# Patient Record
Sex: MALE | Race: WHITE
[De-identification: names, ages, dates, MRNs, and addresses within clinical notes are randomized per-mention and may not be internally consistent; named-entity substitution may affect disease eponyms.]

---

## 2018-02-16 ENCOUNTER — HOSPITAL ENCOUNTER (OUTPATIENT)
Age: 58
End: 2018-02-16
Payer: COMMERCIAL

## 2018-02-16 DIAGNOSIS — K63.5: ICD-10-CM

## 2018-02-16 DIAGNOSIS — Z12.11: Primary | ICD-10-CM

## 2018-02-16 PROCEDURE — 88305 TISSUE EXAM BY PATHOLOGIST: CPT

## 2018-12-22 ENCOUNTER — HOSPITAL ENCOUNTER (OUTPATIENT)
Age: 58
End: 2018-12-22
Payer: COMMERCIAL

## 2018-12-22 DIAGNOSIS — N40.0: ICD-10-CM

## 2018-12-22 DIAGNOSIS — R03.0: Primary | ICD-10-CM

## 2018-12-22 LAB
ANION GAP: 5 (ref 5–15)
BUN SERPL-MCNC: 10 MG/DL (ref 7–18)
BUN/CREAT RATIO: 10.5 RATIO (ref 10–20)
CALCIUM SERPL-MCNC: 8.8 MG/DL (ref 8.5–10.1)
CARBON DIOXIDE: 31 MMOL/L (ref 21–32)
CHLORIDE: 106 MMOL/L (ref 98–107)
CHOLEST SERPL-MCNC: 241 MG/DL
EST GLOM FILT RATE - AFR AMER: 105 ML/MIN (ref 60–?)
GLUCOSE: 92 MG/DL (ref 74–106)
POTASSIUM: 4.9 MMOL/L (ref 3.5–5.1)
PSA,TOTAL - ANNUAL SCREEN: 0.55 NG/ML (ref 0–4)
TRIGLYCERIDES: 201 MG/DL
VLDLC SERPL-MCNC: 40 MG/DL (ref 5–40)

## 2018-12-22 PROCEDURE — 36415 COLL VENOUS BLD VENIPUNCTURE: CPT

## 2018-12-22 PROCEDURE — 80048 BASIC METABOLIC PNL TOTAL CA: CPT

## 2018-12-22 PROCEDURE — 84153 ASSAY OF PSA TOTAL: CPT

## 2018-12-22 PROCEDURE — 80061 LIPID PANEL: CPT

## 2018-12-22 PROCEDURE — G0103 PSA SCREENING: HCPCS

## 2019-12-27 ENCOUNTER — HOSPITAL ENCOUNTER (OUTPATIENT)
Age: 59
End: 2019-12-27
Payer: COMMERCIAL

## 2019-12-27 DIAGNOSIS — Z13.1: ICD-10-CM

## 2019-12-27 DIAGNOSIS — Z13.220: Primary | ICD-10-CM

## 2019-12-27 DIAGNOSIS — N40.0: ICD-10-CM

## 2019-12-27 LAB
ANION GAP: 3 (ref 5–15)
BUN SERPL-MCNC: 13 MG/DL (ref 7–18)
BUN/CREAT RATIO: 13.3 RATIO (ref 10–20)
CALCIUM SERPL-MCNC: 8.4 MG/DL (ref 8.5–10.1)
CARBON DIOXIDE: 31 MMOL/L (ref 21–32)
CHLORIDE: 105 MMOL/L (ref 98–107)
CHOLEST SERPL-MCNC: 201 MG/DL
EST GLOM FILT RATE - AFR AMER: 102 ML/MIN (ref 60–?)
GLUCOSE: 96 MG/DL (ref 74–106)
POTASSIUM: 4 MMOL/L (ref 3.5–5.1)
PSA,TOTAL - ANNUAL SCREEN: 0.44 NG/ML (ref 0–4)
TRIGLYCERIDES: 138 MG/DL
VLDLC SERPL-MCNC: 28 MG/DL (ref 5–40)

## 2019-12-27 PROCEDURE — 80048 BASIC METABOLIC PNL TOTAL CA: CPT

## 2019-12-27 PROCEDURE — 36415 COLL VENOUS BLD VENIPUNCTURE: CPT

## 2019-12-27 PROCEDURE — 84153 ASSAY OF PSA TOTAL: CPT

## 2019-12-27 PROCEDURE — 80061 LIPID PANEL: CPT

## 2019-12-27 PROCEDURE — G0103 PSA SCREENING: HCPCS

## 2020-11-14 ENCOUNTER — HOSPITAL ENCOUNTER (OUTPATIENT)
Age: 60
End: 2020-11-14
Payer: COMMERCIAL

## 2020-11-14 DIAGNOSIS — Z00.00: Primary | ICD-10-CM

## 2020-11-14 LAB
ANION GAP: 5 (ref 5–15)
BUN SERPL-MCNC: 10 MG/DL (ref 7–18)
BUN/CREAT RATIO: 10.7 RATIO (ref 10–20)
CALCIUM SERPL-MCNC: 8.7 MG/DL (ref 8.5–10.1)
CARBON DIOXIDE: 29 MMOL/L (ref 21–32)
CHLORIDE: 107 MMOL/L (ref 98–107)
CHOLEST SERPL-MCNC: 198 MG/DL
EST GLOM FILT RATE - AFR AMER: 107 ML/MIN (ref 60–?)
GLUCOSE: 90 MG/DL (ref 74–106)
MICROALBUMIN UR-MCNC: 10.6 MG/L
POTASSIUM: 4.5 MMOL/L (ref 3.5–5.1)
TRIGLYCERIDES: 144 MG/DL
VLDLC SERPL-MCNC: 29 MG/DL (ref 5–40)

## 2020-11-14 PROCEDURE — 80061 LIPID PANEL: CPT

## 2020-11-14 PROCEDURE — 36415 COLL VENOUS BLD VENIPUNCTURE: CPT

## 2020-11-14 PROCEDURE — 80048 BASIC METABOLIC PNL TOTAL CA: CPT

## 2020-11-14 PROCEDURE — 82043 UR ALBUMIN QUANTITATIVE: CPT

## 2021-06-09 ENCOUNTER — HOSPITAL ENCOUNTER (OUTPATIENT)
Age: 61
End: 2021-06-09
Payer: COMMERCIAL

## 2021-06-09 DIAGNOSIS — M25.529: Primary | ICD-10-CM

## 2021-06-09 PROCEDURE — 73080 X-RAY EXAM OF ELBOW: CPT

## 2021-09-01 ENCOUNTER — HOSPITAL ENCOUNTER (OUTPATIENT)
Age: 61
End: 2021-09-01
Payer: COMMERCIAL

## 2021-09-01 DIAGNOSIS — Z20.822: Primary | ICD-10-CM

## 2021-09-01 PROCEDURE — U0005 INFEC AGEN DETEC AMPLI PROBE: HCPCS

## 2021-09-01 PROCEDURE — U0003 INFECTIOUS AGENT DETECTION BY NUCLEIC ACID (DNA OR RNA); SEVERE ACUTE RESPIRATORY SYNDROME CORONAVIRUS 2 (SARS-COV-2) (CORONAVIRUS DISEASE [COVID-19]), AMPLIFIED PROBE TECHNIQUE, MAKING USE OF HIGH THROUGHPUT TECHNOLOGIES AS DESCRIBED BY CMS-2020-01-R: HCPCS

## 2021-09-01 PROCEDURE — 87635 SARS-COV-2 COVID-19 AMP PRB: CPT

## 2022-02-01 ENCOUNTER — HOSPITAL ENCOUNTER (OUTPATIENT)
Dept: HOSPITAL 100 - CVS | Age: 62
Discharge: ANOTHER HOSPITAL | End: 2022-02-01
Payer: COMMERCIAL

## 2022-02-01 DIAGNOSIS — R01.1: Primary | ICD-10-CM

## 2022-02-01 PROCEDURE — A4216 STERILE WATER/SALINE, 10 ML: HCPCS

## 2022-02-01 PROCEDURE — 93306 TTE W/DOPPLER COMPLETE: CPT

## 2022-02-01 PROCEDURE — C8929 TTE W OR WO FOL WCON,DOPPLER: HCPCS

## 2022-04-04 ENCOUNTER — HOSPITAL ENCOUNTER (OUTPATIENT)
Age: 62
Discharge: HOME | End: 2022-04-04
Payer: COMMERCIAL

## 2022-04-04 DIAGNOSIS — G47.30: Primary | ICD-10-CM

## 2022-04-04 PROCEDURE — 95806 SLEEP STUDY UNATT&RESP EFFT: CPT

## 2022-05-06 ENCOUNTER — HOSPITAL ENCOUNTER (OUTPATIENT)
Dept: HOSPITAL 100 - MTRAD | Age: 62
Discharge: HOME | End: 2022-05-06
Payer: COMMERCIAL

## 2022-05-06 DIAGNOSIS — M25.522: Primary | ICD-10-CM

## 2022-05-06 PROCEDURE — 73080 X-RAY EXAM OF ELBOW: CPT

## 2023-01-06 ENCOUNTER — HOSPITAL ENCOUNTER (OUTPATIENT)
Dept: HOSPITAL 100 - LAB | Age: 63
Discharge: HOME | End: 2023-01-06
Payer: COMMERCIAL

## 2023-01-06 DIAGNOSIS — R35.1: Primary | ICD-10-CM

## 2023-01-06 DIAGNOSIS — R53.83: ICD-10-CM

## 2023-01-06 LAB — PSA,TOTAL - ANNUAL SCREEN: 1.25 NG/ML (ref 0–4)

## 2023-01-06 PROCEDURE — G0103 PSA SCREENING: HCPCS

## 2023-01-06 PROCEDURE — 84403 ASSAY OF TOTAL TESTOSTERONE: CPT

## 2023-01-06 PROCEDURE — 84153 ASSAY OF PSA TOTAL: CPT

## 2023-12-04 DIAGNOSIS — R35.1 NOCTURIA: Primary | ICD-10-CM

## 2023-12-06 RX ORDER — TROSPIUM CHLORIDE 20 MG/1
20 TABLET, FILM COATED ORAL 2 TIMES DAILY
Qty: 60 TABLET | Refills: 0 | Status: SHIPPED | OUTPATIENT
Start: 2023-12-06 | End: 2024-01-04

## 2023-12-30 DIAGNOSIS — R35.1 NOCTURIA: ICD-10-CM

## 2024-01-04 RX ORDER — TROSPIUM CHLORIDE 20 MG/1
20 TABLET, FILM COATED ORAL 2 TIMES DAILY
Qty: 60 TABLET | Refills: 0 | Status: SHIPPED | OUTPATIENT
Start: 2024-01-04 | End: 2024-01-30

## 2024-01-30 DIAGNOSIS — R35.1 NOCTURIA: ICD-10-CM

## 2024-01-30 RX ORDER — TROSPIUM CHLORIDE 20 MG/1
20 TABLET, FILM COATED ORAL 2 TIMES DAILY
Qty: 60 TABLET | Refills: 0 | Status: SHIPPED | OUTPATIENT
Start: 2024-01-30 | End: 2024-02-26

## 2024-02-09 ENCOUNTER — HOSPITAL ENCOUNTER (EMERGENCY)
Age: 64
Discharge: HOME | End: 2024-02-09
Payer: COMMERCIAL

## 2024-02-09 VITALS
HEART RATE: 89 BPM | RESPIRATION RATE: 18 BRPM | OXYGEN SATURATION: 95 % | SYSTOLIC BLOOD PRESSURE: 148 MMHG | DIASTOLIC BLOOD PRESSURE: 69 MMHG | TEMPERATURE: 97.9 F

## 2024-02-09 VITALS — BODY MASS INDEX: 34.9 KG/M2

## 2024-02-09 VITALS — OXYGEN SATURATION: 98 %

## 2024-02-09 VITALS — SYSTOLIC BLOOD PRESSURE: 107 MMHG | DIASTOLIC BLOOD PRESSURE: 69 MMHG

## 2024-02-09 DIAGNOSIS — Z87.891: ICD-10-CM

## 2024-02-09 DIAGNOSIS — R07.9: Primary | ICD-10-CM

## 2024-02-09 LAB
ANION GAP: 7 (ref 5–15)
BUN SERPL-MCNC: 9 MG/DL (ref 7–18)
BUN/CREAT RATIO: 9.8 RATIO (ref 10–20)
CALCIUM SERPL-MCNC: 9.2 MG/DL (ref 8.5–10.1)
CARBON DIOXIDE: 25 MMOL/L (ref 21–32)
CARDIAC TROPONIN I PNL SERPL HS: 10 PG/ML (ref 3–78)
CHLORIDE: 107 MMOL/L (ref 98–107)
DEPRECATED RDW RBC: 44.3 FL (ref 35.1–43.9)
ERYTHROCYTE [DISTWIDTH] IN BLOOD: 12.4 % (ref 11.6–14.6)
EST GLOM FILT RATE - AFR AMER: 107 ML/MIN (ref 60–?)
ESTIMATED CREATININE CLEARANCE: 98.74 ML/MIN
GLUCOSE: 140 MG/DL (ref 74–106)
HCT VFR BLD AUTO: 43.7 % (ref 40–54)
HGB BLD-MCNC: 15.3 G/DL (ref 13–16.5)
IMMATURE GRANULOCYTES COUNT: 0.03 X10^3/UL (ref 0–0)
MCV RBC: 96.9 FL (ref 80–94)
MEAN CORP HGB CONC: 35 G/DL (ref 32–36)
MEAN PLATELET VOL.: 9.6 FL (ref 6.2–12)
NRBC FLAGGED BY ANALYZER: 0 % (ref 0–5)
PLATELET # BLD: 277 K/MM3 (ref 150–450)
POTASSIUM: 3.5 MMOL/L (ref 3.5–5.1)
RBC # BLD AUTO: 4.51 M/MM3 (ref 4.6–6.2)
TROPONIN-I HS: 10 PG/ML (ref 3–78)
WBC # BLD AUTO: 11.6 K/MM3 (ref 4.4–11)

## 2024-02-09 PROCEDURE — 71045 X-RAY EXAM CHEST 1 VIEW: CPT

## 2024-02-09 PROCEDURE — 80048 BASIC METABOLIC PNL TOTAL CA: CPT

## 2024-02-09 PROCEDURE — A4216 STERILE WATER/SALINE, 10 ML: HCPCS

## 2024-02-09 PROCEDURE — 93005 ELECTROCARDIOGRAM TRACING: CPT

## 2024-02-09 PROCEDURE — 85025 COMPLETE CBC W/AUTO DIFF WBC: CPT

## 2024-02-09 PROCEDURE — 84484 ASSAY OF TROPONIN QUANT: CPT

## 2024-02-09 PROCEDURE — 99284 EMERGENCY DEPT VISIT MOD MDM: CPT

## 2024-02-09 NOTE — [id number]
HPI
<LAKSHMI Lucia - Last Filed: 02/09/24 22:03>
History of Present Illness
Chief Complaint: Chest Pain
Narrative
Narrative: 
63-year-old male with no significant past medical history developed midsternal chest pain around 5:30 PM while doing laundry.  He states it feels like a throbbing sensation and occasionally radiates towards the left shoulder.  No pain in the jaw or 
down the arm.  No shortness of breath, nausea or vomiting, or diaphoresis.  He has had a lingering cough since Christmas when he had a viral illness but it is not worsened and he has no recent fever.  He smokes about 6 cigarettes a day.  He has no 
personal cardiac history.  He states he had normal stress testing about 10 years ago.  No family cardiac history.  No history of DVT/PE or risk factors.

PFSH
<LAKSHMI Lucia - Last Filed: 02/09/24 22:03>
formerly [facility]
Home Medications

doxazosin 4 mg tablet 4 mg PO DAILY 03/02/22 [History Last Taken Unknown]
tadalafil 5 mg tablet 5 mg PO DAILY 03/02/22 [History Last Taken Unknown]




Allergy/AdvReac Type Severity Reaction Status Date / Time
acetaminophen AdvReac  Hives Verified 02/09/24 19:28


Surgical History (Reviewed 04/15/22 @ 14:33 by Rena Musa NP, NP-C)

H/O removal of cyst


Social History (Reviewed 04/15/22 @ 14:33 by Rena Musa NP, NP-C)
Smoking Status:  Former smoker quit date: 01/01/12 pack-years: 34 



ROS
<LAKSHMI Lucia - Last Filed: 02/09/24 22:03>
ROS ED
ROS Narrative
Constitutional: Negative for fever, chills, malaise.
CVS: Positive for chest pain.  Negative for syncope.
Respiratory: Positive for cough.  Negative for shortness of breath.
GI: Negative for abdominal pain, nausea, vomiting.
Neuro: Negative for headache.

EXAM
<LAKSHMI Lucia - Last Filed: 02/09/24 22:03>
Physical Exam
Narrative
Exam Narrative: 
CONST: Patient sitting in no acute distress.
EYES: Normal inspection.
NECK: Normal inspection.
RESP: No respiratory distress, CTAB.
CVS: Regular rate and rhythm, no murmur, no gallop. 
ABD: Soft and nontender, no guarding or rebound, nondistended.
SKIN: Color normal, no rash, warm, dry, intact.
EXTREMITIES: Normal appearance, no pedal edema.
NEURO: Oriented x4.
PSYCH: Normal affect.
Const
Vital Signs: 



 02/09/24
19:26 02/09/24
19:59
Temperature 97.9 F 
Temperature Source Temporal 
Pulse Rate 89 
Respiratory Rate 18 
Blood Pressure 148/69 H 
Blood Pressure Mean 95 
Pulse Ox 95 98
Oxygen Delivery Method Room Air Room Air




<Sheldon Gupta MD - Last Filed: 02/09/24 22:52>
Physical Exam
Const
Vital Signs: 



 02/09/24
19:26 02/09/24
19:59
Temperature 97.9 F 
Temperature Source Temporal 
Pulse Rate 89 
Respiratory Rate 18 
Blood Pressure 148/69 H 
Blood Pressure Mean 95 
Pulse Ox 95 98
Oxygen Delivery Method Room Air Room Air




MDM
<LAKSHMI Lucia - Last Filed: 02/09/24 22:03>
[facility]
MDM Narrative
Medical decision making narrative: 

History gathered from: Patient and wife

Patient presents with chest pain that radiates toward his left shoulder that started at 5:30 PM.  He appears well and nontoxic with stable vital signs.  Normal cardiopulmonary exam.  No chest or abdominal tenderness.  No clinical signs of DVT.  CBC 
and BMP are unremarkable.  EKG is sinus rhythm with no ischemic changes.  Troponin is 10, delta pending.  CXR shows no acute process.  Patient was treated with aspirin 325 mg and plan is if serial cardiac enzymes are negative he can follow-up for 
outpatient stress test.
Lab Data
Attestation: I reviewed the patient's lab results.
Labs: 

Laboratory Results - last 24 hr

  02/09/24 02/09/24
  19:55 22:00
WBC  11.6 H 
RBC  4.51 L 
Hgb  15.3 
Hct  43.7 
MCV  96.9 H 
MCH  33.9 H 
MCHC  35.0 
RDW Std Deviation  44.3 H 
RDW Coeff of Mayte  12.4 
Plt Count  277 
MPV  9.6 
Immature Gran % (Auto)  0.300 
Neut % (Auto)  74.5 H 
Lymph % (Auto)  12.8 L 
Mono % (Auto)  10.8 H 
Eos % (Auto)  1.0 
Baso % (Auto)  0.6 
Absolute Neuts (auto)  8.6 H 
Absolute Lymphs (auto)  1.48 
Nucleated RBC %  0 
Sodium  139 
Potassium  3.5 
Chloride  107 
Carbon Dioxide  25.0 
Anion Gap  7 
BUN  9 
Creatinine  0.92 
Estim Creat Clear Calc  98.74 
Est GFR (MDRD) Af Amer  107 
Est GFR (MDRD) Non-Af  89 
BUN/Creatinine Ratio  9.8 L 
Glucose  140 H 
Calcium  9.2 
Troponin I High Sens  10  10



Radiography
Diagnostic Testing: 

Clinical Impression(s) from Imaging Studies

Chest X-Ray  02/09/24 20:00
IMPRESSION:
No radiographic evidence of acute cardiopulmonary disease.
 
Electronically Signed:
Santos Steel DO
2024/02/09 at 20:30 EST
Reading Location ID and State: [id number] / [place]
Tel [phone], Service support  [phone], Fax [phone]
 



ED attending interpretation of 1-view chest x-ray shows normal heart size, no acute infiltrate, edema, or effusion.

EKG
Initial EKG: 
      Attestation: I personally reviewed and interpreted this EKG as follows:
      Interpretation: Sinus Rhythm and No Acute Injury Pattern
      Comments: Normal sinus rhythm 84 bpm 
Normal intervals, no acute ischemic changes

<Sheldon Gupta MD - Last Filed: 02/09/24 22:52>
MDM
MDM Narrative
Medical decision making narrative: 

History gathered from: Patient and wife

Patient presents with chest pain that radiates toward his left shoulder that started at 5:30 PM.  He appears well and nontoxic with stable vital signs.  Normal cardiopulmonary exam.  No chest or abdominal tenderness.  No clinical signs of DVT.  CBC 
and BMP are unremarkable.  EKG is sinus rhythm with no ischemic changes.  Troponin is 10, delta pending.  CXR shows no acute process.  Patient was treated with aspirin 325 mg and plan is if serial cardiac enzymes are negative he can follow-up for 
outpatient stress test.


Dr. Gupta:
I have personally performed a face to face assessment of the patient and have reviewed the GABBIE Note. I performed a substantive portion of the visit including all aspects of the following. My key findings include:
History is chest pain.
Exam is afebrile.  Vital signs noted.  Regular rate and rhythm.  Lungs clear to auscultation bilaterally.  Abdomen soft nontender with normoactive bowel sounds.
Medical Decision Making: Check EKG.  Check chest x-ray.  Chest x-ray 1 view interpreted by myself independently shows no evidence of pneumothorax or pneumonia.  I reviewed the radiology report which confirms my independent interpretation.  Serial 
troponins show no delta as initial is 10 and repeat is 10 at 2 hours.  At this point in time, I feel he can be discharged safely home with follow-up.  Blood pressure is now 121 systolic with a heart rate in the 70s.  Disposition is discharged home 
in stable condition.
Other additions or changes: [None]
History & Record Review
Discussion w/independent historian: Patient and Family
Lab Data
Labs: 

Laboratory Results - last 24 hr

  02/09/24 02/09/24
  19:55 22:00
WBC  11.6 H 
RBC  4.51 L 
Hgb  15.3 
Hct  43.7 
MCV  96.9 H 
MCH  33.9 H 
MCHC  35.0 
RDW Std Deviation  44.3 H 
RDW Coeff of Mayte  12.4 
Plt Count  277 
MPV  9.6 
Immature Gran % (Auto)  0.300 
Neut % (Auto)  74.5 H 
Lymph % (Auto)  12.8 L 
Mono % (Auto)  10.8 H 
Eos % (Auto)  1.0 
Baso % (Auto)  0.6 
Absolute Neuts (auto)  8.6 H 
Absolute Lymphs (auto)  1.48 
Nucleated RBC %  0 
Sodium  139 
Potassium  3.5 
Chloride  107 
Carbon Dioxide  25.0 
Anion Gap  7 
BUN  9 
Creatinine  0.92 
Estim Creat Clear Calc  98.74 
Est GFR (MDRD) Af Amer  107 
Est GFR (MDRD) Non-Af  89 
BUN/Creatinine Ratio  9.8 L 
Glucose  140 H 
Calcium  9.2 
Troponin I High Sens  10  10



Radiography
Diagnostic Testing: 

Clinical Impression(s) from Imaging Studies

Chest X-Ray  02/09/24 20:00
IMPRESSION:
No radiographic evidence of acute cardiopulmonary disease.
 
Electronically Signed:
Santos Steel DO
2024/02/09 at 20:30 EST
Reading Location ID and State: [id number] / [place]
Tel [phone], Service support  [phone], Fax [phone]
 





Discharge Plan
Triage
Chief Complaint: Chest Pain

ED Midlevel Provider: Meredith Jorge

ED Provider: Sheldon Gupta

Dx/Rx/DC Orders
Clinical Impression:
 Chest pain


Instructions:  ED Chest Pain, Uncertain Cause

Prescriptions:
No Action
  doxazosin 4 mg tablet 
   4 mg PO DAILY 
  tadalafil 5 mg tablet 
   5 mg PO DAILY 

Primary Care Provider: Valerio Marte

Referrals:
Valerio Marte MD [Primary Care Provider] - 3-5 Days

Activity Restrictions/Additional Instructions:
Follow-up with your primary care provider for outpatient stress testing.  Return with increased pain, new or worsening symptoms.

Disposition
***Disposition***: Home, Self Care

## 2024-02-09 NOTE — EDS_ITS
HPI    
<LAKSHMI Lucia - Last Filed: 02/09/24 22:03>    
History of Present Illness    
Chief Complaint: Chest Pain    
Narrative    
Narrative:     
63-year-old male with no significant past medical history developed midsternal   
chest pain around 5:30 PM while doing laundry.  He states it feels like a   
throbbing sensation and occasionally radiates towards the left shoulder.  No   
pain in the jaw or down the arm.  No shortness of breath, nausea or vomiting, or  
diaphoresis.  He has had a lingering cough since Christmas when he had a viral   
illness but it is not worsened and he has no recent fever.  He smokes about 6   
cigarettes a day.  He has no personal cardiac history.  He states he had normal   
stress testing about 10 years ago.  No family cardiac history.  No history of   
DVT/PE or risk factors.    
    
PFSH    
<LAKSHMI Lucia - Last Filed: 02/09/24 22:03>    
[facility]    
                                Home Medications    
    
doxazosin 4 mg tablet 4 mg PO DAILY 03/02/22 [History Last Taken Unknown]    
tadalafil 5 mg tablet 5 mg PO DAILY 03/02/22 [History Last Taken Unknown]    
    
    
                                            
    
    
    
Allergy/AdvReac Type Severity Reaction Status Date / Time    
     
acetaminophen AdvReac  Hives Verified 02/09/24 19:28    
    
    
    
Surgical History (Reviewed 04/15/22 @ 14:33 by Rena Musa NP, NP-C)    
    
H/O removal of cyst    
    
    
Social History (Reviewed 04/15/22 @ 14:33 by Rena Musa NP, NP-C)    
Smoking Status:  Former smoker quit date: 01/01/12 pack-years: 34     
    
    
    
ROS    
<LAKSHMI Lucia - Last Filed: 02/09/24 22:03>    
ROS ED    
ROS Narrative    
Constitutional: Negative for fever, chills, malaise.    
CVS: Positive for chest pain.  Negative for syncope.    
Respiratory: Positive for cough.  Negative for shortness of breath.    
GI: Negative for abdominal pain, nausea, vomiting.    
Neuro: Negative for headache.    
    
EXAM    
<LAKSHMI Lucia - Last Filed: 02/09/24 22:03>    
Physical Exam    
Narrative    
Exam Narrative:     
CONST: Patient sitting in no acute distress.    
EYES: Normal inspection.    
NECK: Normal inspection.    
RESP: No respiratory distress, CTAB.    
CVS: Regular rate and rhythm, no murmur, no gallop.     
ABD: Soft and nontender, no guarding or rebound, nondistended.    
SKIN: Color normal, no rash, warm, dry, intact.    
EXTREMITIES: Normal appearance, no pedal edema.    
NEURO: Oriented x4.    
PSYCH: Normal affect.    
Const    
Vital Signs:     
    
                                            
    
    
    
 02/09/24    
19:26 02/09/24    
19:59    
     
Temperature 97.9 F     
     
Temperature Source Temporal     
     
Pulse Rate 89     
     
Respiratory Rate 18     
     
Blood Pressure 148/69 H     
     
Blood Pressure Mean 95     
     
Pulse Ox 95 98    
     
Oxygen Delivery Method Room Air Room Air    
    
    
    
    
    
<Sheldon Gupta MD - Last Filed: 02/09/24 22:52>    
Physical Exam    
Const    
Vital Signs:     
    
                                            
    
    
    
 02/09/24    
19:26 02/09/24    
19:59    
     
Temperature 97.9 F     
     
Temperature Source Temporal     
     
Pulse Rate 89     
     
Respiratory Rate 18     
     
Blood Pressure 148/69 H     
     
Blood Pressure Mean 95     
     
Pulse Ox 95 98    
     
Oxygen Delivery Method Room Air Room Air    
    
    
    
    
    
MDM    
<LAKSHMI Lucia - Last Filed: 02/09/24 22:03>    
[facility]    
MDM Narrative    
Medical decision making narrative:     
    
History gathered from: Patient and wife    
    
Patient presents with chest pain that radiates toward his left shoulder that   
started at 5:30 PM.  He appears well and nontoxic with stable vital signs.    
Normal cardiopulmonary exam.  No chest or abdominal tenderness.  No clinical   
signs of DVT.  CBC and BMP are unremarkable.  EKG is sinus rhythm with no   
ischemic changes.  Troponin is 10, delta pending.  CXR shows no acute process.    
Patient was treated with aspirin 325 mg and plan is if serial cardiac enzymes   
are negative he can follow-up for outpatient stress test.    
Lab Data    
Attestation: I reviewed the patient's lab results.    
Labs:     
    
                         Laboratory Results - last 24 hr    
    
    
    
  02/09/24 02/09/24    
    
  19:55 22:00    
     
WBC  11.6 H     
     
RBC  4.51 L     
     
Hgb  15.3     
     
Hct  43.7     
     
MCV  96.9 H     
     
MCH  33.9 H     
     
MCHC  35.0     
     
RDW Std Deviation  44.3 H     
     
RDW Coeff of Mayte  12.4     
     
Plt Count  277     
     
MPV  9.6     
     
Immature Gran % (Auto)  0.300     
     
Neut % (Auto)  74.5 H     
     
Lymph % (Auto)  12.8 L     
     
Mono % (Auto)  10.8 H     
     
Eos % (Auto)  1.0     
     
Baso % (Auto)  0.6     
     
Absolute Neuts (auto)  8.6 H     
     
Absolute Lymphs (auto)  1.48     
     
Nucleated RBC %  0     
     
Sodium  139     
     
Potassium  3.5     
     
Chloride  107     
     
Carbon Dioxide  25.0     
     
Anion Gap  7     
     
BUN  9     
     
Creatinine  0.92     
     
Estim Creat Clear Calc  98.74     
     
Est GFR (MDRD) Af Amer  107     
     
Est GFR (MDRD) Non-Af  89     
     
BUN/Creatinine Ratio  9.8 L     
     
Glucose  140 H     
     
Calcium  9.2     
     
Troponin I High Sens  10  10    
    
    
    
    
Radiography    
Diagnostic Testing:     
    
Clinical Impression(s) from Imaging Studies    
    
Chest X-Ray  02/09/24 20:00    
IMPRESSION:    
No radiographic evidence of acute cardiopulmonary disease.    
     
Electronically Signed:    
Santos Steel DO    
2024/02/09 at 20:30 EST    
Reading Location ID and State: [id number] / [place]    
Tel [phone], Service support  [phone], Fax [phone]    
     
    
    
    
ED attending interpretation of 1-view chest x-ray shows normal heart size, no   
acute infiltrate, edema, or effusion.    
    
EKG    
Initial EKG:     
      Attestation: I personally reviewed and interpreted this EKG as follows:    
      Interpretation: Sinus Rhythm and No Acute Injury Pattern    
      Comments: Normal sinus rhythm 84 bpm     
Normal intervals, no acute ischemic changes    
    
<Sheldon Gupta MD - Last Filed: 02/09/24 22:52>    
MDM    
MDM Narrative    
Medical decision making narrative:     
    
History gathered from: Patient and wife    
    
Patient presents with chest pain that radiates toward his left shoulder that   
started at 5:30 PM.  He appears well and nontoxic with stable vital signs.    
Normal cardiopulmonary exam.  No chest or abdominal tenderness.  No clinical   
signs of DVT.  CBC and BMP are unremarkable.  EKG is sinus rhythm with no   
ischemic changes.  Troponin is 10, delta pending.  CXR shows no acute process.    
Patient was treated with aspirin 325 mg and plan is if serial cardiac enzymes   
are negative he can follow-up for outpatient stress test.    
    
    
Dr. Gupta:    
I have personally performed a face to face assessment of the patient and have   
reviewed the GABBIE Note. I performed a substantive portion of the visit including   
all aspects of the following. My key findings include:    
History is chest pain.    
Exam is afebrile.  Vital signs noted.  Regular rate and rhythm.  Lungs clear to   
auscultation bilaterally.  Abdomen soft nontender with normoactive bowel sounds.    
Medical Decision Making: Check EKG.  Check chest x-ray.  Chest x-ray 1 view   
interpreted by myself independently shows no evidence of pneumothorax or   
pneumonia.  I reviewed the radiology report which confirms my independent   
interpretation.  Serial troponins show no delta as initial is 10 and repeat is   
10 at 2 hours.  At this point in time, I feel he can be discharged safely home   
with follow-up.  Blood pressure is now 121 systolic with a heart rate in the   
70s.  Disposition is discharged home in stable condition.    
Other additions or changes: [None]    
History & Record Review    
Discussion w/independent historian: Patient and Family    
Lab Data    
Labs:     
    
                         Laboratory Results - last 24 hr    
    
    
    
  02/09/24 02/09/24    
    
  19:55 22:00    
     
WBC  11.6 H     
     
RBC  4.51 L     
     
Hgb  15.3     
     
Hct  43.7     
     
MCV  96.9 H     
     
MCH  33.9 H     
     
MCHC  35.0     
     
RDW Std Deviation  44.3 H     
     
RDW Coeff of Mayte  12.4     
     
Plt Count  277     
     
MPV  9.6     
     
Immature Gran % (Auto)  0.300     
     
Neut % (Auto)  74.5 H     
     
Lymph % (Auto)  12.8 L     
     
Mono % (Auto)  10.8 H     
     
Eos % (Auto)  1.0     
     
Baso % (Auto)  0.6     
     
Absolute Neuts (auto)  8.6 H     
     
Absolute Lymphs (auto)  1.48     
     
Nucleated RBC %  0     
     
Sodium  139     
     
Potassium  3.5     
     
Chloride  107     
     
Carbon Dioxide  25.0     
     
Anion Gap  7     
     
BUN  9     
     
Creatinine  0.92     
     
Estim Creat Clear Calc  98.74     
     
Est GFR (MDRD) Af Amer  107     
     
Est GFR (MDRD) Non-Af  89     
     
BUN/Creatinine Ratio  9.8 L     
     
Glucose  140 H     
     
Calcium  9.2     
     
Troponin I High Sens  10  10    
    
    
    
    
Radiography    
Diagnostic Testing:     
    
Clinical Impression(s) from Imaging Studies    
    
Chest X-Ray  02/09/24 20:00    
IMPRESSION:    
No radiographic evidence of acute cardiopulmonary disease.    
     
Electronically Signed:    
Santos Steel DO    
2024/02/09 at 20:30 EST    
Reading Location ID and State: [address] / [place]    
Tel [phone], Service support  [phone], Fax [phone]    
     
    
    
    
    
    
Discharge Plan    
Triage    
Chief Complaint: Chest Pain    
    
ED Midlevel Provider: Meredith Jorge    
    
ED Provider: Sheldon Gupta    
    
Dx/Rx/DC Orders    
Clinical Impression:    
 Chest pain    
    
    
Instructions:  ED Chest Pain, Uncertain Cause    
    
Prescriptions:    
No Action    
  doxazosin 4 mg tablet     
   4 mg PO DAILY     
  tadalafil 5 mg tablet     
   5 mg PO DAILY     
    
Primary Care Provider: Valerio Marte    
    
Referrals:    
Valerio Marte MD [Primary Care Provider] - 3-5 Days    
    
Activity Restrictions/Additional Instructions:    
Follow-up with your primary care provider for outpatient stress testing.  Return  
with increased pain, new or worsening symptoms.    
    
Disposition    
***Disposition***: Home, Self Care

## 2024-02-09 NOTE — XMS RPT_ITS
Comprehensive CCD (C-CDA v2.1)  
  
                          Created on: 2024  
  
  
TIANAMENDYHAIARGENTINA RUIZ  
External Reference #: [id number]  
: 1960  
Sex: Male  
  
Demographics  
  
  
                                        Address             [address] [place] [address]  [address]  
   
                                        Home Phone          [phone]  
   
                                        Work Phone          [phone]  
   
                                        Work Phone          [phone]  
   
                                        Preferred Language  en  
   
                                        Marital Status        
   
                                        Holiness Affiliation Unknown  
   
                                        Race                White  
   
                                        Ethnic Group        Unknown  
  
  
Author  
  
  
                                        Name                Unknown  
   
                                        Address             [address]  
[address]  
[place]  [address]  
   
                                        Phone               [phone]  
   
                                        Organization        [facility]  
  
  
Care Team Providers  
  
  
                                Care Team Member Name Role            Phone  
   
                                None, No PCP    Unavailable     Unavailable  
   
                                Unavailable     Unavailable     [phone]  
   
                                MD PATO CLEARY Attending       Unavailabl  
e  
   
                                CLEARYMD PATO HICKEY Referring       Unavailabl  
e  
   
                                MD PATO CLEARY Attending       Unavailabl  
e  
   
                                MD PATO CLEARY Referring       Unavailabl  
e  
   
                                MD PATO CLEARY Attending       Unavailabl  
e  
   
                                CLEARYMD PATO HICKEY Referring       Unavailabl  
e  
   
                                MD PATO CLEARY Referring       Unavailabl  
e  
   
                                MD PATO CLEARY Attending       Unavailabl  
e  
  
  
  
Allergies  
  
  
                                                    Allergy   
Classification                          Reported   
Allergen(s)               Allergy Type              Date of   
Onset                     Reaction(s)               Facility  
   
                                                      
(8 sources)                             Acetaminophen;   
Translations:   
[acetaminophen] Drug Allergy                                    [facility]  
Work Phone:   
[phone]  
0  
  
  
  
Medications  
Completed/Discontinued Medications  
  
  
  
                      Medication Drug Class(es) Dates      Sig (Normalized) Sig   
(Original)  
   
                                                    24 hr alfuzosin   
hydrochloride 10 mg   
extended release oral   
tablet  
(4 sources)                             alpha-Adrenergic   
Blocker                                 Start:   
2023                              take 1 tablet by   
mouth once daily                        Alfuzosin HCl ER   
10 MG Oral Tablet   
Extended Release   
24 Hour Take 1   
tablet daily   
Quantity: 30   
Refills: 11   
Ordered:   
2023 Pato Cleary II, MD Start   
: 2023   
Active  
   
                                                    Doxazosin  
(2 sources)                             alpha-Adrenergic   
Blocker                                                     Cardura 4 MG Oral   
Tablet Quantity:   
0 Refills: 0   
Ordered:   
2023 DO   
Active  
  
  
  
                                          
   
                                          
   
                                          
   
                                          
   
                                          
   
                                          
  
  
  
Problems  
  
  
                      Problem Classification Problem    Date       Documented Da  
te Episodic/Chronic  
   
                                                    Genitourinary symptoms   
and ill-defined   
conditions  
(6 sources)                             Nocturia;   
Translations:   
[Nocturia]                                                  Episodic  
   
                                                    Hyperplasia of prostate  
(4 sources)                             Benign prostatic   
hypertrophy with   
outflow obstruction;   
Translations: [Benign   
localized hyperplasia   
of prostate with   
urinary obstruction   
and other lower   
urinary tract symptoms   
(LUTS)]                                                     Chronic  
   
                                                    Malaise and fatigue  
(4 sources)                             Fatigue; Translations:   
[Other malaise and   
fatigue]                                                    Episodic  
   
                                                    Other male genital   
disorders  
(4 sources)                             Male erectile   
dysfunction,   
unspecified;   
Translations:   
[Erectile dysfunction]                                         Chronic  
  
  
  
Results  
  
  
                      Test Name  Value      Interpretation Reference Range Facil  
ity  
  
  
  
                                          
   
                                          
   
                                          
   
                                          
   
                                          
   
                                          
   
                                          
   
                                          
   
                                          
   
                                          
   
                                          
   
                                          
   
                                          
   
                                          
   
                                          
   
                                          
   
                                          
   
                                          
   
                                          
   
                                          
   
                                          
   
                                          
   
                                          
   
                                          
   
                                          
   
                                          
   
                                          
   
                                          
   
                                          
   
                                          
   
                                          
   
                                          
   
                                          
   
                                          
   
                                          
   
                                          
   
                                          
   
                                          
   
                                          
   
                                          
   
                                          
   
                                          
   
                                          
   
                                          
   
                                          
   
                                          
   
                                          
   
                                          
   
                                          
   
                                          
   
                                          
   
                                          
   
                                          
   
                                          
  
  
  
Vital Signs  
  
  
                      Date Time  Vital Sign Value      Performing Clinician Faci  
lity  
   
                                                    2023   
15:                              Body mass index (BMI)   
[Ratio]             33.37 kg/m2         No PCP None         [facility]  
d  DO  
Work Phone:   
[phone]   
15:                              Body surface area   
Derived from formula 2.18 m2             No PCP None         [facility]  
d  DO  
Work Phone:   
[phone]   
15:      Body weight     102.51 kg       No PCP None     [facility]  
an  
d  DO  
Work Phone:   
[phone]   
15:      Respiratory rate 16 /min         No PCP None     [facility]  
vanessa  
d  DO  
Work Phone:   
[phone]   
15:                              Body mass index (BMI)   
[Ratio]             33.82 kg/m2         No PCP None         [facility]  
Work Phone:   
[phone]   
15:                              Body surface area   
Derived from formula 2.19 m2             No PCP None         [facility]  
Work Phone:   
[phone]   
15:      Body weight     103.87 kg       No PCP None     [facility]Slal[facility]  
nd  
Work Phone:   
[phone]   
15:      Respiratory rate 16 /min         No PCP None     [facility]uldwnr[facility]Fkq[facility]  
and  
Work Phone:   
[phone]   
13:      Body height     175.26 cm       No PCP None     [facility]Ksgmvcl[facility]Xgdyh  
nd  
Work Phone:   
[phone]   
13:                              Body mass index (BMI)   
[Ratio]             34.26 kg/m2         No PCP None         [facility]Vcoc[facility]  
Work Phone:   
[phone]  
   
                                                    2023   
13:                              Body surface area   
Derived from formula 2.2 m2              No PCP None         [facility]Cmnwusp[facility]Dzaffdn  
Work Phone:   
[phone]  
   
                                                    2023   
13:      Body weight     105.24 kg       No PCP None     [facility]Rxlmjzj[facility]Lmeqv  
nd  
Work Phone:   
[phone]  
   
                                                    2023   
13:      Respiratory rate 16 /min         No PCP None     [facility]Yx[facility]  
and  
Work Phone:   
[phone]  
  
  
  
Encounters  
  
  
                          Encounter Date Encounter Type Care Provider Facility  
   
                                        Start: 2023   Office outpatient vi  
sit   
15 minutes                No PCP None               [facility]   
232 DO  
Work Phone:   
[phone]  
   
                          Start: 2023 ambulatory   MD PATO CLEARY Fa  
cility:[id number]  
   
                                        Start: 2023   Office outpatient vi  
sit   
25 minutes                No PCP None               [facility]   
232 DO  
Work Phone:   
[phone]  
   
                          Start: 2023 ambulatory   MD PATO CLEARY Fa  
cility:[id number]  
   
                                        Start: 2023   Office outpatient vi  
sit   
25 minutes                No PCP None               [facility]  
Work Phone:   
[phone]  
   
                          Start: 2023 ambulatory   MD PATO CLEARY Fa  
cility:[id number]  
   
                                        Start: 2023   Office outpatient ne  
w 45   
minutes                   No PCP None               [facility]  
Work Phone:   
[phone]  
   
                          Start: 2023 ambulatory   MD PATO CLEARY Fa  
cility:[id number]  
  
  
  
Procedures  
  
  
                          Date         Procedure    Procedure Detail Performing   
Clinician  
   
                                       Excision of cyst              No PCP None  
  
  
  
Plan of Treatment  
  
  
                          Date         Care Activity Detail       Author  
   
                                        Start: 2023   FUV, Provider: Pato Cleary II, Status: Pen,   
Time: 3:15 PM                           FUV, Provider: Pato Cleary II, Status: Pen,   
Time: 3:15 PM                           [facility]   
 DO  
Work Phone:   
[phone]  
   
                                        Start: 2023   JOSSELYN, Provider  
:   
Pato Cleary II, Status:   
Pen, Time: 10:15 AM                     JOSSELYN, Provider:   
Pato Cleary II, Status:   
Pen, Time: 10:15 AM                     [facility]  
Work Phone:   
[phone]  
   
                                        Start: 2023   FUV, Provider: Pato Cleary II, Status: Pen,   
Time: 3:30 PM                           FUV, Provider: Pato Cleary II, Status: Pen,   
Time: 3:30 PM                           [facility]kk[facility]  
Work Phone:   
[phone]  
  
  
  
Payers  
  
  
                          Date         Payer Category Payer        Policy ID  
   
                          1960   Unknown                   [id number] 2.16.  
[id number]  
   
                          1960   Unknown                   [id number] 2.16.  
[id number]  
   
                          1960   Unknown                   [id number] 2.16.  
[id number]  
   
                          1960   Unknown                   [id number] 2.16.  
[id number]  
   
                                       Unknown      KEYUR         
   
                                       Unknown                   [id number]  
  
  
  
Social History  
  
  
                          Date         Type         Detail       Facility  
   
                                       Former smoker Former smoker [facility]  
[facility]  
Work Phone: [phone]  
  
  
  
Chief complaint Narrative - Reported 2023  
  
  
                                Note Date & Type Note            Facility  
   
                                                    2023 Chief complaint   
Narrative - Reported                    An interactive audio and video   
telecommunication system which   
permits real time communications   
between the patient (at the   
originating site) and provider (at   
the distant site) was utilized to   
provide this telehealth   
service.Verbal consent was   
requested and obtained from   
ANA MONTIEL on this date,   
2023 10:15 AM , for a   
telehealth visit.1 MONTH F/U W/ UA   
and PVR                                 [facility]   
232 DO  
Work Phone:   
[phone]  
  
  
  
History of Present illness Narrative 2023  
  
  
                                Note Date & Type Note            Facility  
   
                                                    2023 History of Presen  
t   
illness Narrative                       Patient is here for   
medication check and lab   
results. He was given   
Uroxatral last visit and   
states it has helped his   
urinary sx..Nocturia x 1,   
depending on fluid intake..   
He did stop his Cardura last   
visit. Most recent PSA was   
1.25 on . ED is chronic.   
Libido is diminished and mild   
fatigue. Most recent T level   
was 573 on .                        [facility]  
Work Phone: [phone]  
  
  
  
History of Present illness Narrative 2023  
  
  
                                Note Date & Type Note            Facility  
   
                                                    2023 History of Presen  
t   
illness Narrative                       Patient is here for 1 month   
f/u w/ medication check, UA,   
and PVR. He was given Gemtesa   
and states this was very   
helpful but he cannot afford   
it.. He is also taking   
Uroxatral. Most recent PSA   
was 1.25 on . ED is   
chronic. Libido is diminished   
and mild fatigue. Most recent   
T level was 573 on .                [facility]    
DO  
Work Phone: [phone]  
  
  
  
History of Present illness Narrative 2023  
  
  
                                Note Date & Type Note            Facility  
   
                                                    2023 History of Presen  
t   
illness Narrative                       Patient is here for 1 month   
f/u w/ w/ UA for hx of   
microhematuria. No recent   
workup. Chronic BPH sx are   
mild and stable. Denies   
urgency and frequency. Denies   
dysuria. Denies hematuria.   
Nocturia x1. He was started   
on Trospium last visit and   
states. He is also taking   
Uroxatral. He has failed   
Gemtesa. Most recent PSA was   
1.25 on . ED is chronic.   
Sildenafil PRN.                         [facility]    
DO  
Work Phone: [phone]  
  
  
  
History of Present illness Narrative 2022  
  
  
                                Note Date & Type Note            Facility  
   
                                                    2022 History of Presen  
t   
illness Narrative                       Patient is here to establish   
for ED..Patient is taking   
Sildenafil, has tried Cialis   
in the past..This is helpful.   
Libido is diminished. Mild   
Fatigue. Chronic BPH with   
LUTs, sx are mild and   
stable..Caffeine makes LUTs   
worse..Patient is taking   
Doxazosin. No hematuria, No   
dysuria..Nocturia x 1,   
depending on fluid   
intake..Some hesitancy..PSA   
0.44 ()                            [facility]  
Work Phone: [phone]  
  
  
  
Chief Complaint  
Erectile Dysfunctionmedication check and lab results1 month w/ UA  
  
Family History  
No Family History Records FoundUnknown Family Member  
  
                                Name            Dates           Details  
   
                                                    No pertinent family history:  
 Mother, Father(V49.89, Z78.9)  
                                                    Status:Active  
  
                              Unknown Family Member  
  
                                Name            Dates           Details  
   
                                                    No pertinent family history:  
 Mother, Father(V49.89, Z78.9)  
                                                    Status:Active  
  
                              Unknown Family Member  
  
                                Name            Dates           Details  
   
                                                    No pertinent family history:  
 Mother, Father(V49.89, Z78.9)  
                                                    Status:Active  
  
                              Unknown Family Member  
  
                                Name            Dates           Details  
   
                                                    No pertinent family history:  
 Mother, Father(V49.89, Z78.9)  
                                                    Status:Active  
  
  
  
Summary Purpose  
  
  
                                                      
  
  
  
Advance Directives  
No Advanced Directives Records FoundNo Advanced Directives Records Found  
  
Additional Source Comments  
  
  
  
                                                    PREGNANCY (unrecognized sect  
ion and content)  
   
                                                    No Pregnancy Status Records   
FoundNo Pregnancy Status Records Found  
  
  
  
  
                                                    INFORMATION SOURCE (unrecogn  
ized section and content)  
   
                                          
  
  
  
                                          
   
                                          
  
  
  
                                DATE CREATED    AUTHOR          AUTHOR'S PORTILLO LEVINE  
   
                                2023                       [employer]  
  
  
FOR RECORDS PERTAINING TO PATIENTS WHO ARE OR HAVE BEEN ENROLLED IN A CHEMICAL 
DEPENDENCY/SUBSTANCEABUSE PROGRAM, SOME INFORMATION MAY BE OMITTED. This 
clinical summary was aggregated from multiple sources. Caution should be 
exercised in using it in the provision of clinical care. This summary normalizes
 information from multiple sources, and as a consequence, information in this 
document may materially change the coding, format and clinical context of 
patient data. In addition, data may be omitted in some cases. CLINICAL DECISIONS
 SHOULD BE BASED ON THE PRIMARY CLINICAL RECORDS. [facility] [employer] provides
 no warranty or guarantee of the accuracy or completeness of information in this
 document.

## 2024-02-09 NOTE — RAD_ITS
REPORT-ID:[id number] 
 
EXAM:  XR CHEST, 1 VIEW 
 
CLINICAL INDICATION:  chest pain 
 
TECHNIQUE:  Frontal view of the chest. 
 
COMPARISON:  No relevant prior studies available. 
 
FINDINGS: 
 
LUNGS AND PLEURAL SPACES:  No significant abnormality.  No consolidation or 
edema.  No pneumothorax.  No effusion. 
 
HEART:  No significant abnormality.  Cardiac silhouette not enlarged. 
 
MEDIASTINUM:  Central airways and mediastinal contour are unremarkable. 
 
BONES/JOINTS:  No significant abnormality.  No acute fracture. 
 
SOFT TISSUES:  No significant abnormality. 
 
ORDER #: [id number] RAD/Chest 1 View (Portable)  
IMPRESSION:  
No radiographic evidence of acute cardiopulmonary disease.  
 
  
Electronically Signed:  
Santos Steel DO  
2024/02/09 at 20:30 EST  
Reading Location ID and State: 1424 / [place]  
Tel [phone], Service support  [phone], Fax [phone]

## 2024-02-25 DIAGNOSIS — R35.1 NOCTURIA: ICD-10-CM

## 2024-02-26 RX ORDER — TROSPIUM CHLORIDE 20 MG/1
20 TABLET, FILM COATED ORAL 2 TIMES DAILY
Qty: 60 TABLET | Refills: 0 | Status: SHIPPED | OUTPATIENT
Start: 2024-02-26 | End: 2024-03-25

## 2024-03-23 DIAGNOSIS — R35.1 NOCTURIA: ICD-10-CM

## 2024-03-25 RX ORDER — TROSPIUM CHLORIDE 20 MG/1
20 TABLET, FILM COATED ORAL 2 TIMES DAILY
Qty: 60 TABLET | Refills: 0 | Status: SHIPPED | OUTPATIENT
Start: 2024-03-25 | End: 2024-04-22

## 2024-04-20 DIAGNOSIS — R35.1 NOCTURIA: ICD-10-CM

## 2024-04-22 RX ORDER — TROSPIUM CHLORIDE 20 MG/1
20 TABLET, FILM COATED ORAL 2 TIMES DAILY
Qty: 60 TABLET | Refills: 0 | Status: SHIPPED | OUTPATIENT
Start: 2024-04-22 | End: 2024-05-29

## 2024-05-25 DIAGNOSIS — R35.1 NOCTURIA: ICD-10-CM

## 2024-05-29 RX ORDER — TROSPIUM CHLORIDE 20 MG/1
20 TABLET, FILM COATED ORAL 2 TIMES DAILY
Qty: 60 TABLET | Refills: 0 | Status: SHIPPED | OUTPATIENT
Start: 2024-05-29

## 2025-05-21 ENCOUNTER — HOSPITAL ENCOUNTER (OUTPATIENT)
Dept: HOSPITAL 100 - MTLAB | Age: 65
Discharge: HOME | End: 2025-05-21
Payer: COMMERCIAL

## 2025-05-21 DIAGNOSIS — R10.9: Primary | ICD-10-CM

## 2025-05-21 DIAGNOSIS — R25.1: ICD-10-CM

## 2025-05-21 DIAGNOSIS — Z12.5: ICD-10-CM

## 2025-05-21 DIAGNOSIS — M54.2: ICD-10-CM

## 2025-05-21 LAB
BLOOD UR QL: 10 /UL
DEPRECATED RDW RBC: 45.5 FL (ref 35.1–43.9)
ERYTHROCYTE [DISTWIDTH] IN BLOOD: 12.2 % (ref 11.6–14.6)
HCT VFR BLD AUTO: 39.4 % (ref 40–54)
HGB BLD-MCNC: 14.2 G/DL (ref 13–16.5)
MCV RBC: 100.8 FL (ref 80–94)
MEAN PLATELET VOL.: 10.1 FL (ref 6.2–12)
NRBC FLAGGED BY ANALYZER: 0 % (ref 0–5)
PLATELET # BLD: 242 K/MM3 (ref 150–450)
PROT UR QL STRIP.AUTO: 15 MG/DL
RBC # BLD AUTO: 3.91 M/MM3 (ref 4.6–6.2)
URINE PRESERVATIVE: (no result)
WBC # BLD AUTO: 8.9 K/MM3 (ref 4.4–11)

## 2025-05-21 PROCEDURE — 85025 COMPLETE CBC W/AUTO DIFF WBC: CPT

## 2025-05-21 PROCEDURE — 84153 ASSAY OF PSA TOTAL: CPT

## 2025-05-21 PROCEDURE — 85652 RBC SED RATE AUTOMATED: CPT

## 2025-05-21 PROCEDURE — 81002 URINALYSIS NONAUTO W/O SCOPE: CPT

## 2025-05-21 PROCEDURE — G0103 PSA SCREENING: HCPCS

## 2025-05-21 PROCEDURE — 80053 COMPREHEN METABOLIC PANEL: CPT

## 2025-05-21 PROCEDURE — 83735 ASSAY OF MAGNESIUM: CPT

## 2025-05-21 PROCEDURE — 72050 X-RAY EXAM NECK SPINE 4/5VWS: CPT

## 2025-05-21 PROCEDURE — 82607 VITAMIN B-12: CPT

## 2025-05-21 PROCEDURE — 84403 ASSAY OF TOTAL TESTOSTERONE: CPT

## 2025-05-21 PROCEDURE — 82306 VITAMIN D 25 HYDROXY: CPT

## 2025-05-21 PROCEDURE — 86140 C-REACTIVE PROTEIN: CPT

## 2025-05-21 PROCEDURE — 36415 COLL VENOUS BLD VENIPUNCTURE: CPT

## 2025-05-21 PROCEDURE — 84443 ASSAY THYROID STIM HORMONE: CPT

## 2025-05-22 LAB
ALBUMIN SERPL-MCNC: 4.2 G/DL (ref 3.4–4.8)
ALP SERPL-CCNC: 78 U/L (ref 40–129)
ALT SERPL-CCNC: 30 U/L (ref ?–46)
ANION GAP SERPL CALC-SCNC: 15 MMOL/L (ref 5–15)
AST(SGOT): 41 U/L (ref ?–37)
BUN SERPL-MCNC: 10 MG/DL (ref 4–19)
BUN/CREAT SERPL: 15.1 RATIO (ref 10–20)
CHLORIDE: 103 MMOL/L (ref 98–108)
CO2 BLDCV-SCNC: 22.8 MMOL/L (ref 21–32)
CRP SERPL-MCNC: 9.44 MG/L (ref 0–3)
GLUCOSE SERPL-MCNC: 99 MG/DL (ref 70–99)
MAGNESIUM SPEC-MCNC: 1.9 MG/DL (ref 1.5–2.2)
POTASSIUM SPEC-MCNC: 3.6 MMOL/L (ref 3.3–5.1)
PSA,TOTAL - ANNUAL SCREEN: 0.63 NG/ML (ref 0.02–4)
VIT B12 SERPL-MCNC: 830 PG/ML (ref 180–914)
VITAMIN D,25 HYDROXY: 27.4 NG/ML (ref 30–100)